# Patient Record
Sex: MALE | Race: WHITE | NOT HISPANIC OR LATINO | Employment: FULL TIME | URBAN - METROPOLITAN AREA
[De-identification: names, ages, dates, MRNs, and addresses within clinical notes are randomized per-mention and may not be internally consistent; named-entity substitution may affect disease eponyms.]

---

## 2019-08-29 ENCOUNTER — OCCUPATIONAL HEALTH (OUTPATIENT)
Dept: URGENT CARE | Facility: CLINIC | Age: 54
End: 2019-08-29

## 2019-08-29 DIAGNOSIS — Z02.89 ENCOUNTER FOR PHYSICAL EXAMINATION RELATED TO EMPLOYMENT: ICD-10-CM

## 2019-08-29 PROCEDURE — 80305 DRUG TEST PRSMV DIR OPT OBS: CPT | Mod: S$GLB,,, | Performed by: EMERGENCY MEDICINE

## 2019-08-29 PROCEDURE — 80305 PR COLLECTION ONLY DRUG SCREEN: ICD-10-PCS | Mod: S$GLB,,, | Performed by: EMERGENCY MEDICINE

## 2020-03-03 ENCOUNTER — OCCUPATIONAL HEALTH (OUTPATIENT)
Dept: URGENT CARE | Facility: CLINIC | Age: 55
End: 2020-03-03

## 2020-03-03 DIAGNOSIS — Z02.89 ENCOUNTER FOR PHYSICAL EXAMINATION RELATED TO EMPLOYMENT: ICD-10-CM

## 2020-03-03 PROCEDURE — 80305 PR COLLECTION ONLY DRUG SCREEN: ICD-10-PCS | Mod: S$GLB,,, | Performed by: EMERGENCY MEDICINE

## 2020-03-03 PROCEDURE — 80305 DRUG TEST PRSMV DIR OPT OBS: CPT | Mod: S$GLB,,, | Performed by: EMERGENCY MEDICINE

## 2020-09-03 ENCOUNTER — TELEPHONE (OUTPATIENT)
Dept: CARDIOLOGY | Facility: CLINIC | Age: 55
End: 2020-09-03

## 2020-09-09 ENCOUNTER — TELEPHONE (OUTPATIENT)
Dept: CARDIOLOGY | Facility: CLINIC | Age: 55
End: 2020-09-09

## 2020-09-22 ENCOUNTER — OFFICE VISIT (OUTPATIENT)
Dept: CARDIOLOGY | Facility: CLINIC | Age: 55
End: 2020-09-22
Payer: COMMERCIAL

## 2020-09-22 VITALS
OXYGEN SATURATION: 98 % | HEART RATE: 73 BPM | SYSTOLIC BLOOD PRESSURE: 166 MMHG | DIASTOLIC BLOOD PRESSURE: 100 MMHG | RESPIRATION RATE: 16 BRPM | HEIGHT: 65 IN | WEIGHT: 143 LBS | BODY MASS INDEX: 23.82 KG/M2

## 2020-09-22 DIAGNOSIS — R94.31 NONSPECIFIC ABNORMAL ELECTROCARDIOGRAM (ECG) (EKG): ICD-10-CM

## 2020-09-22 DIAGNOSIS — I10 HYPERTENSION, UNSPECIFIED TYPE: Primary | ICD-10-CM

## 2020-09-22 DIAGNOSIS — H11.32 BURST BLOOD VESSEL IN EYE, LEFT: ICD-10-CM

## 2020-09-22 DIAGNOSIS — R07.89 CHEST PRESSURE: ICD-10-CM

## 2020-09-22 PROCEDURE — 3008F PR BODY MASS INDEX (BMI) DOCUMENTED: ICD-10-PCS | Mod: CPTII,S$GLB,, | Performed by: INTERNAL MEDICINE

## 2020-09-22 PROCEDURE — 93000 ELECTROCARDIOGRAM COMPLETE: CPT | Mod: S$GLB,,, | Performed by: INTERNAL MEDICINE

## 2020-09-22 PROCEDURE — 99205 OFFICE O/P NEW HI 60 MIN: CPT | Mod: S$GLB,,, | Performed by: INTERNAL MEDICINE

## 2020-09-22 PROCEDURE — 99205 PR OFFICE/OUTPT VISIT, NEW, LEVL V, 60-74 MIN: ICD-10-PCS | Mod: S$GLB,,, | Performed by: INTERNAL MEDICINE

## 2020-09-22 PROCEDURE — 3008F BODY MASS INDEX DOCD: CPT | Mod: CPTII,S$GLB,, | Performed by: INTERNAL MEDICINE

## 2020-09-22 PROCEDURE — 93000 EKG 12-LEAD: ICD-10-PCS | Mod: S$GLB,,, | Performed by: INTERNAL MEDICINE

## 2020-09-22 RX ORDER — LOSARTAN POTASSIUM 25 MG/1
12.5 TABLET ORAL 2 TIMES DAILY
Qty: 90 TABLET | Refills: 0 | Status: SHIPPED | OUTPATIENT
Start: 2020-09-22 | End: 2020-12-16

## 2020-09-22 RX ORDER — BRIMONIDINE TARTRATE 1.5 MG/ML
1 SOLUTION/ DROPS OPHTHALMIC 2 TIMES DAILY
COMMUNITY
Start: 2020-08-29

## 2020-09-22 NOTE — PROGRESS NOTES
Subjective:    Patient ID:  Kyle Newton is a 55 y.o. male patient here for evaluation Eye Problem and Hypertension      History of Present Illness:     Mr. Newton is a 55-year-old male who presented today for new patient appointment due to uncontrolled blood pressure.  Patient denies any chest discomfort, dizziness, or shortness of breath.    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 No pertinent surgical history.  Social History     Tobacco Use    Smoking status: Current Every Day Smoker     Years: 15.00     Types: Cigarettes   Substance Use Topics    Alcohol use: Yes     Frequency: 2-4 times a month    Drug use: Not on file        Review of Systems   As noted in HPI in addition     Constitutional: Negative for chills, fatigue and fever.   Eyes: No double vision, No blurred vision  Neuro: No headaches, No dizziness  Respiratory: Negative for cough, shortness of breath and wheezing.    Cardiovascular: Negative for chest pain. Negative for palpitations and leg swelling.   Gastrointestinal: Negative for abdominal pain, No melena, diarrhea, nausea and vomiting.   Genitourinary: Negative for dysuria and frequency, Negative for hematuria  Skin: Negative for bruising, Negative for edema or discoloration noted.   Endocrine: Negative for polyphagia, Negative for heat intolerance, Negative for cold intolerance  Psychiatric: Negative for depression, Negative for anxiety, Negative for memory loss  Musculoskeletal: Negative for neck pain, Negative for muscle weakness, Negative for back pain          Objective:        Vitals:    09/22/20 1649   BP: (!) 166/100   Pulse: 73   Resp: 16       No results found for: WBC, HGB, HCT, PLT, CHOL, TRIG, HDL, LDL, NONHDL, ALT, AST, NA, K, CL, CREATININE, BUN, CO2, TSH, PSA, INR, GLUF, HGBA1C, MICROALBUR     ECHOCARDIOGRAM RESULTS  No results found  for this or any previous visit.      CURRENT/PREVIOUS VISIT EKG  No results found for this or any previous visit.  No procedure found.   No results found for this or any previous visit.  No procedure found.        PHYSICAL EXAM    GENERAL: well built, well nourished, well-developed in no apparent distress alert and oriented.   HEENT: Normocephalic. Pupils normal and conjunctivae normal.  Mucous membranes normal, no cyanosis or icterus, trachea central,no pallor or icterus is noted..   NECK: No JVD. No bruit..   CARDIAC: Regular rate and rhythm. S1 is normal.S2 is normal.No gallops, clicks or murmurs noted at this time.  CHEST ANATOMY: normal.   LUNGS: Clear to auscultation. No wheezing or rhonchi..   ABDOMEN: Soft no masses or organomegaly.  No abdomen pulsations or bruits.  Normal bowel sounds. No pulsations and no masses felt, No guarding or rebound.   EXTREMITIES: No cyanosis, clubbing or edema noted at this time., no calf tenderness bilaterally.   PERIPHERAL VASCULAR SYSTEM: Good palpable distal pulses.   CENTRAL NERVOUS SYSTEM: No focal motor or sensory deficits noted.   SKIN: Skin without lesions, moist, well perfused.   MUSCLE STRENGTH & TONE: No noteable weakness, atrophy or abnormal movement.     I HAVE REVIEWED :    The vital signs, nurses notes, and all the pertinent radiology and labs.         Current Outpatient Medications   Medication Instructions    brimonidine 0.15 % OPTH DROP (ALPHAGAN) 0.15 % ophthalmic solution 1 drop, Both Eyes, 2 times daily    losartan (COZAAR) 12.5 mg, Oral, 2 times daily          Assessment:     1.  Uncontrolled hypertension  2.  Burst blood vessel in left eye  3.  Abnormal EKG  4.  History of noncompliance.      Plan:     1.  Will start the patient on losartan 12.5 mg p.o. b.i.d..  2.  Advised the patient to keep a blood pressure log 3 to 4 times a day for about 5-7 days.  3.  Would like to obtain a 2D echocardiogram to evaluate LV function and valves due to longstanding  history of hypertension that has been uncontrolled as well as abnormal EKG.  4.  Will see the patient back for follow-up post testing.    Follow up in about 6 weeks (around 11/3/2020).

## 2020-10-06 ENCOUNTER — HOSPITAL ENCOUNTER (OUTPATIENT)
Dept: CARDIOLOGY | Facility: CLINIC | Age: 55
Discharge: HOME | End: 2020-10-06
Attending: NURSE PRACTITIONER
Payer: COMMERCIAL

## 2020-10-06 VITALS — HEIGHT: 65 IN | BODY MASS INDEX: 22.33 KG/M2 | WEIGHT: 134 LBS

## 2020-10-06 DIAGNOSIS — R94.31 NONSPECIFIC ABNORMAL ELECTROCARDIOGRAM (ECG) (EKG): ICD-10-CM

## 2020-10-06 DIAGNOSIS — I10 HYPERTENSION, UNSPECIFIED TYPE: ICD-10-CM

## 2020-10-06 PROCEDURE — 93306 ECHO (CUPID ONLY): ICD-10-PCS | Mod: S$GLB,,, | Performed by: INTERNAL MEDICINE

## 2020-10-06 PROCEDURE — 93306 TTE W/DOPPLER COMPLETE: CPT | Mod: S$GLB,,, | Performed by: INTERNAL MEDICINE

## 2020-10-07 LAB
AORTIC ROOT ANNULUS: 2.7 CM
AORTIC VALVE CUSP SEPERATION: 1.8 CM
AV INDEX (PROSTH): 0.71
AV MEAN GRADIENT: 5 MMHG
AV PEAK GRADIENT: 8 MMHG
AV VALVE AREA: 2.95 CM2
AV VELOCITY RATIO: 0.83
BSA FOR ECHO PROCEDURE: 1.67 M2
CV ECHO LV RWT: 0.4 CM
DOP CALC AO PEAK VEL: 1.38 M/S
DOP CALC AO VTI: 33.2 CM
DOP CALC LVOT AREA: 4.2 CM2
DOP CALC LVOT DIAMETER: 2.3 CM
DOP CALC LVOT PEAK VEL: 1.15 M/S
DOP CALC LVOT STROKE VOLUME: 98 CM3
DOP CALCLVOT PEAK VEL VTI: 23.6 CM
E WAVE DECELERATION TIME: 169 MS
E/A RATIO: 1.13
E/E' RATIO: 7.33 M/S
ECHO LV POSTERIOR WALL: 0.89 CM (ref 0.6–1.1)
FRACTIONAL SHORTENING: 29 % (ref 28–44)
INTERVENTRICULAR SEPTUM: 0.75 CM (ref 0.6–1.1)
IVRT: 127 MS
LEFT ATRIUM SIZE: 4.1 CM
LEFT INTERNAL DIMENSION IN SYSTOLE: 3.17 CM (ref 2.1–4)
LEFT VENTRICLE MASS INDEX: 69 G/M2
LEFT VENTRICULAR INTERNAL DIMENSION IN DIASTOLE: 4.45 CM (ref 3.5–6)
LEFT VENTRICULAR MASS: 115.2 G
LV LATERAL E/E' RATIO: 6.42 M/S
LV SEPTAL E/E' RATIO: 8.56 M/S
MV PEAK A VEL: 0.68 M/S
MV PEAK E VEL: 0.77 M/S
MV STENOSIS PRESSURE HALF TIME: 103 MS
MV VALVE AREA P 1/2 METHOD: 2.14 CM2
PISA TR MAX VEL: 2.1 M/S
RIGHT VENTRICULAR END-DIASTOLIC DIMENSION: 1.91 CM
TDI LATERAL: 0.12 M/S
TDI SEPTAL: 0.09 M/S
TDI: 0.11 M/S
TR MAX PG: 18 MMHG

## 2020-11-17 ENCOUNTER — TELEPHONE (OUTPATIENT)
Dept: CARDIOLOGY | Facility: CLINIC | Age: 55
End: 2020-11-17

## 2020-11-17 NOTE — TELEPHONE ENCOUNTER
Lm to please return my call. I can either get him in Monday or Tuesday. KR    ----- Message from Milvia Mims, Patient Care Assistant sent at 11/17/2020 12:05 PM CST -----  Regarding: appointment  Contact: patient  Type: Needs Medical Advice  Who Called:  patient   Best Call Back Number: [phone] (home)   Additional Information: patient states he would like a callback regarding rescheduling his appt that was canceled on 11/06/2020. Please call pt to advice. Thanks!

## 2020-11-20 ENCOUNTER — TELEPHONE (OUTPATIENT)
Dept: CARDIOLOGY | Facility: CLINIC | Age: 55
End: 2020-11-20

## 2020-11-20 NOTE — TELEPHONE ENCOUNTER
Scheduled with Ally on Wednesday at 9 am  ----- Message from No Rene sent at 11/20/2020 12:24 PM CST -----  Regarding: appt  please call patient to schedule he said any day next week he is available [phone]

## 2020-11-25 ENCOUNTER — TELEPHONE (OUTPATIENT)
Dept: CARDIOLOGY | Facility: CLINIC | Age: 55
End: 2020-11-25

## 2021-06-14 ENCOUNTER — OCCUPATIONAL HEALTH (OUTPATIENT)
Dept: URGENT CARE | Facility: CLINIC | Age: 56
End: 2021-06-14

## 2021-06-14 PROCEDURE — 80305 DRUG TEST PRSMV DIR OPT OBS: CPT | Mod: S$GLB,,, | Performed by: EMERGENCY MEDICINE

## 2021-06-14 PROCEDURE — 80305 PR DRUG SCREEN - 1: ICD-10-PCS | Mod: S$GLB,,, | Performed by: EMERGENCY MEDICINE

## 2021-08-13 ENCOUNTER — TELEPHONE (OUTPATIENT)
Dept: ORTHOPEDICS | Facility: CLINIC | Age: 56
End: 2021-08-13

## 2022-02-25 ENCOUNTER — HOSPITAL ENCOUNTER (OUTPATIENT)
Facility: HOSPITAL | Age: 57
Discharge: HOME | End: 2022-02-25
Attending: INTERNAL MEDICINE | Admitting: INTERNAL MEDICINE
Payer: MEDICAID

## 2022-02-25 DIAGNOSIS — R00.1 BRADYCARDIA: ICD-10-CM

## 2022-02-25 DIAGNOSIS — I49.5 SINOATRIAL NODE DYSFUNCTION: Primary | ICD-10-CM

## 2022-02-25 DIAGNOSIS — I47.10 SVT (SUPRAVENTRICULAR TACHYCARDIA): ICD-10-CM

## 2022-02-25 DIAGNOSIS — I63.30 CEREBRAL THROMBOSIS WITH CEREBRAL INFARCTION: ICD-10-CM

## 2022-02-25 LAB — SARS-COV-2 RDRP RESP QL NAA+PROBE: NEGATIVE

## 2022-02-25 PROCEDURE — C1764 EVENT RECORDER, CARDIAC: HCPCS | Performed by: INTERNAL MEDICINE

## 2022-02-25 PROCEDURE — 33285 INSJ SUBQ CAR RHYTHM MNTR: CPT | Performed by: INTERNAL MEDICINE

## 2022-02-25 PROCEDURE — U0002 COVID-19 LAB TEST NON-CDC: HCPCS | Performed by: INTERNAL MEDICINE

## 2022-02-25 RX ORDER — LIDOCAINE HYDROCHLORIDE 10 MG/ML
1 INJECTION, SOLUTION EPIDURAL; INFILTRATION; INTRACAUDAL; PERINEURAL ONCE
Status: DISCONTINUED | OUTPATIENT
Start: 2022-02-25 | End: 2022-02-25

## 2022-02-25 RX ORDER — LIDOCAINE HYDROCHLORIDE 10 MG/ML
1 INJECTION, SOLUTION EPIDURAL; INFILTRATION; INTRACAUDAL; PERINEURAL ONCE
Status: DISCONTINUED | OUTPATIENT
Start: 2022-02-25 | End: 2022-02-25 | Stop reason: HOSPADM

## 2022-02-25 NOTE — DISCHARGE SUMMARY
[facility]  Cardiology  Discharge Summary      Patient Name: Kyle Newtno  MRN: [id number]  Admission Date: 2/25/2022  Hospital Length of Stay: 0 days  Discharge Date and Time:  02/25/2022 9:33 AM  Attending Physician: Christopher Gutierrez MD  Discharging Provider: Christopher Gutierrez MD  Primary Care Physician: Primary Doctor No    HPI:  56-year-old male with a history of a thrombotic CVA and possible underlying atrial fibrillation.  Currently cryptogenic CVA as his diagnosis.  He underwent a 30 day monitor that did not show AFib but did show significant bradycardia worrisome for sick sinus syndrome.  There was no definitive evidence of atrial fibrillation but injections suspicion is still high.  We therefore schedule him for outpatient loop recorder implant for more extensive monitoring going forward.    Procedure(s) (LRB):  INSERTION, IMPLANTABLE LOOP RECORDER (N/A)     Indwelling Lines/Drains at time of discharge: none  Lines/Drains/Airways     None                 Hospital Course (synopsis of major diagnoses, care, treatment, and services provided during the course of the hospital stay):  Patient underwent successful loop recorder implantation with [employer].  There were no complications and his threshold was acceptable.  He was stable and ready for discharge home after [facility].  Wound instructions given to the patient.  Monitor and monitoring instructions given    Consults: none    Significant Diagnostic Studies:  See op note    Pending Diagnostic Studies:     None          Final Active Diagnoses:    Diagnosis Date Noted POA    SVT (supraventricular tachycardia) [I47.1] 02/25/2022 Unknown    Cerebral thrombosis with cerebral infarction [I63.30] 02/25/2022 Unknown      Problems Resolved During this Admission:       Discharged Condition: good    Follow Up: 1 week    Patient Instructions:      Remove dressing in 24 hours     Activity as tolerated      Medications:  Reconciled Home Medications:      Medication List      CONTINUE taking these medications    brimonidine 0.15 % OPTH DROP 0.15 % ophthalmic solution  Commonly known as: ALPHAGAN  Place 1 drop into both eyes 2 (two) times daily.     losartan 25 MG tablet  Commonly known as: COZAAR  TAKE 1/2 TABLET (12.5 MG TOTAL) BY MOUTH 2 (TWO) TIMES A DAY.            Time spent on the discharge of patient: <30 minutes    Christopher Gutierrez MD  Cardiology  [facility]

## 2022-02-25 NOTE — DISCHARGE INSTRUCTIONS
Discharge Instructions for Loop Recorders:  You may shower in 24 hours after the procedure. Allow soapy water to gently run over chest incision, not directly on incision. Do not rub or scrub incision. No bathtubs or submerging in water until site is completely healed.   Keep site clean and dry at all times.   Inspect site daily for tenderness, discharge, or signs of infection.  Apply ice pack for 24 hours  Do not remove the steri strips, allow them to fall off on their own

## 2022-04-25 DIAGNOSIS — I63.9 CVA (CEREBROVASCULAR ACCIDENT): Primary | ICD-10-CM

## 2022-04-25 DIAGNOSIS — R20.0 NUMBNESS: ICD-10-CM

## 2022-04-25 DIAGNOSIS — R53.1 WEAKNESS: ICD-10-CM

## 2022-05-26 ENCOUNTER — HOSPITAL ENCOUNTER (OUTPATIENT)
Dept: RADIOLOGY | Facility: HOSPITAL | Age: 57
Discharge: HOME | End: 2022-05-26
Attending: NURSE PRACTITIONER
Payer: MEDICAID

## 2022-05-26 DIAGNOSIS — R53.1 WEAKNESS: ICD-10-CM

## 2022-05-26 DIAGNOSIS — I63.9 CVA (CEREBROVASCULAR ACCIDENT): ICD-10-CM

## 2022-05-26 DIAGNOSIS — R20.0 NUMBNESS: ICD-10-CM

## 2022-05-26 PROCEDURE — 70551 MRI BRAIN STEM W/O DYE: CPT | Mod: TC,PO

## 2022-08-09 DIAGNOSIS — R53.1 WEAKNESS: ICD-10-CM

## 2022-08-09 DIAGNOSIS — R20.0 NUMBNESS: ICD-10-CM

## 2022-08-09 DIAGNOSIS — I63.9 CVA (CEREBRAL VASCULAR ACCIDENT): Primary | ICD-10-CM

## 2022-09-21 ENCOUNTER — HOSPITAL ENCOUNTER (OUTPATIENT)
Dept: RADIOLOGY | Facility: HOSPITAL | Age: 57
Discharge: HOME | End: 2022-09-21
Attending: NURSE PRACTITIONER
Payer: MEDICAID

## 2022-09-21 DIAGNOSIS — R20.0 NUMBNESS: ICD-10-CM

## 2022-09-21 DIAGNOSIS — I63.9 CVA (CEREBRAL VASCULAR ACCIDENT): ICD-10-CM

## 2022-09-21 DIAGNOSIS — R53.1 WEAKNESS: ICD-10-CM

## 2022-09-21 PROCEDURE — 70544 MR ANGIOGRAPHY HEAD W/O DYE: CPT | Mod: TC,PO

## 2023-06-30 ENCOUNTER — TELEPHONE (OUTPATIENT)
Dept: HEMATOLOGY/ONCOLOGY | Facility: CLINIC | Age: 58
End: 2023-06-30
Payer: MEDICARE

## 2023-06-30 NOTE — NURSING
Oncology Navigation   Intake  Internal / External Referral: External (Dr Kristian Briggs)  Date of Referral: 06/29/23  Initial Nurse Navigator Contact: 06/30/23  Referral to Initial Contact Timeline (days): 1  Date Worked: 06/30/23  First Appointment Available: 07/05/23 (declined requested the first available)  Appointment Date: 07/19/23 (Dr Aurash Khoobehi)  First Available Date vs. Scheduled Date (days): 14  Multiple appointments: No     Treatment                              Acuity      Follow Up  No follow-ups on file.